# Patient Record
Sex: FEMALE | Race: WHITE | NOT HISPANIC OR LATINO | Employment: UNEMPLOYED | URBAN - METROPOLITAN AREA
[De-identification: names, ages, dates, MRNs, and addresses within clinical notes are randomized per-mention and may not be internally consistent; named-entity substitution may affect disease eponyms.]

---

## 2023-03-06 DIAGNOSIS — E78.5 HYPERLIPIDEMIA, UNSPECIFIED HYPERLIPIDEMIA TYPE: ICD-10-CM

## 2023-03-06 RX ORDER — ATORVASTATIN CALCIUM 10 MG/1
10 TABLET, FILM COATED ORAL DAILY
Qty: 30 TABLET | Refills: 5 | Status: SHIPPED | OUTPATIENT
Start: 2023-03-06 | End: 2023-06-22 | Stop reason: SDUPTHER

## 2023-03-07 DIAGNOSIS — F41.9 ANXIETY: ICD-10-CM

## 2023-03-07 DIAGNOSIS — I15.9 SECONDARY HYPERTENSION: ICD-10-CM

## 2023-03-07 RX ORDER — AMIODARONE HYDROCHLORIDE 200 MG/1
TABLET ORAL
Qty: 90 TABLET | Refills: 0 | Status: SHIPPED | OUTPATIENT
Start: 2023-03-07 | End: 2023-06-22 | Stop reason: SDUPTHER

## 2023-03-07 RX ORDER — PAROXETINE 10 MG/1
10 TABLET, FILM COATED ORAL EVERY MORNING
Qty: 90 TABLET | Refills: 0 | Status: SHIPPED | OUTPATIENT
Start: 2023-03-07 | End: 2023-06-22 | Stop reason: SDUPTHER

## 2023-03-08 RX ORDER — SPIRONOLACTONE 25 MG/1
25 TABLET ORAL DAILY
Qty: 90 TABLET | Refills: 0 | Status: SHIPPED | OUTPATIENT
Start: 2023-03-08 | End: 2023-06-22 | Stop reason: SDUPTHER

## 2023-03-08 RX ORDER — QUETIAPINE FUMARATE 25 MG/1
25 TABLET, FILM COATED ORAL 2 TIMES DAILY
Qty: 180 TABLET | Refills: 0 | Status: SHIPPED | OUTPATIENT
Start: 2023-03-08 | End: 2023-06-22 | Stop reason: SDUPTHER

## 2023-03-21 DIAGNOSIS — I10 HYPERTENSION, UNSPECIFIED TYPE: ICD-10-CM

## 2023-03-21 RX ORDER — METOPROLOL SUCCINATE 25 MG/1
1 TABLET, EXTENDED RELEASE ORAL DAILY
COMMUNITY
Start: 2021-11-03 | End: 2023-03-21 | Stop reason: SDUPTHER

## 2023-03-22 RX ORDER — METOPROLOL SUCCINATE 25 MG/1
25 TABLET, EXTENDED RELEASE ORAL DAILY
Qty: 90 TABLET | Refills: 0 | Status: SHIPPED | OUTPATIENT
Start: 2023-03-22 | End: 2023-06-22 | Stop reason: ALTCHOICE

## 2023-03-27 DIAGNOSIS — I48.91 ATRIAL FIBRILLATION, UNSPECIFIED TYPE (MULTI): ICD-10-CM

## 2023-03-27 RX ORDER — APIXABAN 2.5 MG/1
2.5 TABLET, FILM COATED ORAL 2 TIMES DAILY
COMMUNITY
End: 2023-03-27 | Stop reason: SDUPTHER

## 2023-03-28 RX ORDER — APIXABAN 2.5 MG/1
2.5 TABLET, FILM COATED ORAL 2 TIMES DAILY
Qty: 180 TABLET | Refills: 0 | Status: SHIPPED | OUTPATIENT
Start: 2023-03-28 | End: 2023-06-22 | Stop reason: SDUPTHER

## 2023-04-17 DIAGNOSIS — I10 HYPERTENSION, UNSPECIFIED TYPE: ICD-10-CM

## 2023-04-17 RX ORDER — AMLODIPINE BESYLATE 5 MG/1
5 TABLET ORAL DAILY
Qty: 90 TABLET | Refills: 0 | Status: SHIPPED | OUTPATIENT
Start: 2023-04-17 | End: 2023-06-22 | Stop reason: SDUPTHER

## 2023-04-17 RX ORDER — AMLODIPINE BESYLATE 5 MG/1
5 TABLET ORAL DAILY
COMMUNITY
End: 2023-04-17 | Stop reason: SDUPTHER

## 2023-05-25 ENCOUNTER — TELEPHONE (OUTPATIENT)
Dept: PRIMARY CARE | Facility: CLINIC | Age: 88
End: 2023-05-25
Payer: MEDICARE

## 2023-05-25 PROCEDURE — 99222 1ST HOSP IP/OBS MODERATE 55: CPT | Performed by: INTERNAL MEDICINE

## 2023-05-26 PROCEDURE — 99232 SBSQ HOSP IP/OBS MODERATE 35: CPT | Performed by: INTERNAL MEDICINE

## 2023-05-27 PROCEDURE — 99232 SBSQ HOSP IP/OBS MODERATE 35: CPT | Performed by: INTERNAL MEDICINE

## 2023-05-28 PROCEDURE — 99232 SBSQ HOSP IP/OBS MODERATE 35: CPT | Performed by: INTERNAL MEDICINE

## 2023-05-29 PROCEDURE — 99232 SBSQ HOSP IP/OBS MODERATE 35: CPT | Performed by: INTERNAL MEDICINE

## 2023-05-30 PROCEDURE — 99231 SBSQ HOSP IP/OBS SF/LOW 25: CPT | Performed by: INTERNAL MEDICINE

## 2023-05-31 PROCEDURE — 99231 SBSQ HOSP IP/OBS SF/LOW 25: CPT | Performed by: INTERNAL MEDICINE

## 2023-06-01 PROCEDURE — 99231 SBSQ HOSP IP/OBS SF/LOW 25: CPT | Performed by: INTERNAL MEDICINE

## 2023-06-02 PROCEDURE — 99238 HOSP IP/OBS DSCHRG MGMT 30/<: CPT | Performed by: INTERNAL MEDICINE

## 2023-06-03 PROCEDURE — 99306 1ST NF CARE HIGH MDM 50: CPT | Performed by: INTERNAL MEDICINE

## 2023-06-07 PROCEDURE — 99307 SBSQ NF CARE SF MDM 10: CPT | Performed by: INTERNAL MEDICINE

## 2023-06-14 PROCEDURE — 99307 SBSQ NF CARE SF MDM 10: CPT | Performed by: INTERNAL MEDICINE

## 2023-06-16 DIAGNOSIS — I10 HYPERTENSION, UNSPECIFIED TYPE: ICD-10-CM

## 2023-06-16 RX ORDER — METOPROLOL SUCCINATE 25 MG/1
25 TABLET, EXTENDED RELEASE ORAL DAILY
Qty: 30 TABLET | Refills: 0 | Status: CANCELLED | OUTPATIENT
Start: 2023-06-16 | End: 2023-07-16

## 2023-06-22 ENCOUNTER — OFFICE VISIT (OUTPATIENT)
Dept: PRIMARY CARE | Facility: CLINIC | Age: 88
End: 2023-06-22
Payer: MEDICARE

## 2023-06-22 VITALS
HEIGHT: 64 IN | BODY MASS INDEX: 19.97 KG/M2 | DIASTOLIC BLOOD PRESSURE: 56 MMHG | WEIGHT: 117 LBS | SYSTOLIC BLOOD PRESSURE: 102 MMHG

## 2023-06-22 DIAGNOSIS — I48.91 ATRIAL FIBRILLATION, UNSPECIFIED TYPE (MULTI): ICD-10-CM

## 2023-06-22 DIAGNOSIS — F41.9 ANXIETY: ICD-10-CM

## 2023-06-22 DIAGNOSIS — E78.5 HYPERLIPIDEMIA, UNSPECIFIED HYPERLIPIDEMIA TYPE: ICD-10-CM

## 2023-06-22 DIAGNOSIS — I15.9 SECONDARY HYPERTENSION: ICD-10-CM

## 2023-06-22 DIAGNOSIS — I10 HYPERTENSION, UNSPECIFIED TYPE: ICD-10-CM

## 2023-06-22 PROBLEM — F03.92 DEMENTIA WITH PSYCHOSIS (MULTI): Status: ACTIVE | Noted: 2023-06-22

## 2023-06-22 PROBLEM — R00.1 BRADYCARDIA: Status: ACTIVE | Noted: 2023-06-22

## 2023-06-22 PROBLEM — I63.9 CVA (CEREBRAL VASCULAR ACCIDENT) (MULTI): Status: ACTIVE | Noted: 2023-06-22

## 2023-06-22 PROBLEM — M54.50 LOW BACK PAIN: Status: ACTIVE | Noted: 2023-06-22

## 2023-06-22 PROBLEM — G47.00 INSOMNIA: Status: ACTIVE | Noted: 2023-06-22

## 2023-06-22 PROCEDURE — 99214 OFFICE O/P EST MOD 30 MIN: CPT | Performed by: INTERNAL MEDICINE

## 2023-06-22 PROCEDURE — 3078F DIAST BP <80 MM HG: CPT | Performed by: INTERNAL MEDICINE

## 2023-06-22 PROCEDURE — 1036F TOBACCO NON-USER: CPT | Performed by: INTERNAL MEDICINE

## 2023-06-22 PROCEDURE — 3074F SYST BP LT 130 MM HG: CPT | Performed by: INTERNAL MEDICINE

## 2023-06-22 PROCEDURE — 1159F MED LIST DOCD IN RCRD: CPT | Performed by: INTERNAL MEDICINE

## 2023-06-22 RX ORDER — SPIRONOLACTONE 25 MG/1
25 TABLET ORAL DAILY
Qty: 90 TABLET | Refills: 0 | Status: SHIPPED | OUTPATIENT
Start: 2023-06-22 | End: 2023-09-14 | Stop reason: SDUPTHER

## 2023-06-22 RX ORDER — TRAZODONE HYDROCHLORIDE 100 MG/1
100 TABLET ORAL NIGHTLY
COMMUNITY
End: 2023-08-29 | Stop reason: SDUPTHER

## 2023-06-22 RX ORDER — NAPROXEN SODIUM 220 MG/1
81 TABLET, FILM COATED ORAL DAILY
COMMUNITY
Start: 2021-11-06

## 2023-06-22 RX ORDER — PAROXETINE 10 MG/1
10 TABLET, FILM COATED ORAL EVERY MORNING
Qty: 90 TABLET | Refills: 0 | Status: SHIPPED | OUTPATIENT
Start: 2023-06-22 | End: 2023-09-14 | Stop reason: SDUPTHER

## 2023-06-22 RX ORDER — ATORVASTATIN CALCIUM 10 MG/1
10 TABLET, FILM COATED ORAL DAILY
Qty: 30 TABLET | Refills: 5 | Status: SHIPPED | OUTPATIENT
Start: 2023-06-22 | End: 2023-09-14 | Stop reason: SDUPTHER

## 2023-06-22 RX ORDER — ALBUTEROL SULFATE 90 UG/1
2 AEROSOL, METERED RESPIRATORY (INHALATION) EVERY 4 HOURS PRN
COMMUNITY

## 2023-06-22 RX ORDER — APIXABAN 2.5 MG/1
2.5 TABLET, FILM COATED ORAL 2 TIMES DAILY
Qty: 180 TABLET | Refills: 0 | Status: SHIPPED | OUTPATIENT
Start: 2023-06-22 | End: 2023-09-14 | Stop reason: SDUPTHER

## 2023-06-22 RX ORDER — AMIODARONE HYDROCHLORIDE 200 MG/1
TABLET ORAL
Qty: 90 TABLET | Refills: 0 | Status: SHIPPED | OUTPATIENT
Start: 2023-06-22 | End: 2023-09-14 | Stop reason: SDUPTHER

## 2023-06-22 RX ORDER — AMLODIPINE BESYLATE 5 MG/1
5 TABLET ORAL DAILY
Qty: 90 TABLET | Refills: 0 | Status: SHIPPED | OUTPATIENT
Start: 2023-06-22 | End: 2023-09-14 | Stop reason: SDUPTHER

## 2023-06-22 RX ORDER — QUETIAPINE FUMARATE 25 MG/1
25 TABLET, FILM COATED ORAL 2 TIMES DAILY
Qty: 180 TABLET | Refills: 0 | Status: SHIPPED | OUTPATIENT
Start: 2023-06-22 | End: 2023-09-14 | Stop reason: SDUPTHER

## 2023-06-22 NOTE — PROGRESS NOTES
Subjective   Patient ID: Heather Bowman is a 88 y.o. female who presents for Follow-up ([place]).    HPI   Patient is here for follow-up from hospital stay for bradycardia then she was at rehab and requiring oxygen but discharged home without oxygen need  Patient is much more alert since she is back home  Her dementia remains quite dense  She is eating little better  Overall doing well     Past recap  patient is here for follow-up  Needs refills on medication  did blood work   asking for samples of eliquis     She is staying with the   She is doing better than before  Family is keeping a close eye on her  She has quite advanced dementia  She remains unsteady on her feet had few falls without any injury  Needs flu shot  Psychosis is doing better      Patient is here from ER visit  Last week patient had a fall at home. She got up not using the walker turn around and fell. No syncopal episode. Her CAT scan and x-ray was negative  Patient remains very unsteady on her feet  Patient is having cough off and on chest feels congested  Daughter is wondering if patient can have something to help with her anxiety. Been the home care nurse comes patient gets extremely anxious and does not get comfortable with the bath  Caregiver is recommending patient take Ativan     Patient is monitoring blood pressure heart rate  Heart rate is staying above 45 mostly above 50  Patient is not dizzy  Patient just states none naps a lot  She does have problem with memory but no further deterioration  Blood pressure is okay     Patient was hospitalized for severe bradycardia and fatigue and CHF  Started on Lasix and her dose of metoprolol was reduced  Patient is still feeling tired she is here with the daughter she states eats and sleeps  Patient is very forgetful  Daughter wants to review all her medications     Patient is here for follow-up on hospital visit  Patient had multiple stroke in setting of new onset A. fib  She was started  "on Eliquis but patient is not taking Eliquis does not want to take it  She had some psychosis event which improved with Seroquel  Patient does have sundowning and dementia manifested more in the hospital  At home she is sleeping better on Seroquel and feeling better        past recap  To establish PCP  Moved from [place]  July 1 fell back in shower sprained right ankle  She is having a lot of pain since then  Also patient is having a lot of anxiety she has dementia and move has made her more anxious  We will be moving in the apartment soon  Daughter's main concern also remains insomnia  Patient is using Ambien apparently recently     Past medical history: Common bile duct stent for common bile duct stones s/p removal because of cholecystitis, A. fib 1 episode post procedure, hearing loss, dementia anxiety hypertension  Social history: Non-smoker nondrinker lives with    Review of Systems    Objective   /56   Ht 1.626 m (5' 4\")   Wt 53.1 kg (117 lb)   BMI 20.08 kg/m²     Physical Exam  Vitals reviewed.   Constitutional:       Appearance: Normal appearance.   HENT:      Head: Normocephalic and atraumatic.      Right Ear: Tympanic membrane, ear canal and external ear normal.      Left Ear: Tympanic membrane, ear canal and external ear normal.      Nose: Nose normal.      Mouth/Throat:      Pharynx: Oropharynx is clear.   Eyes:      Extraocular Movements: Extraocular movements intact.      Conjunctiva/sclera: Conjunctivae normal.      Pupils: Pupils are equal, round, and reactive to light.   Cardiovascular:      Rate and Rhythm: Normal rate and regular rhythm.      Pulses: Normal pulses.      Heart sounds: Normal heart sounds.   Pulmonary:      Effort: Pulmonary effort is normal.      Breath sounds: Normal breath sounds.   Abdominal:      General: Abdomen is flat. Bowel sounds are normal.      Palpations: Abdomen is soft.   Musculoskeletal:      Cervical back: Normal range of motion and neck supple. "   Skin:     General: Skin is warm and dry.   Neurological:      General: No focal deficit present.      Mental Status: She is alert.   Psychiatric:         Mood and Affect: Mood normal.         Assessment/Plan   Problem List Items Addressed This Visit          Circulatory    Atrial fibrillation (CMS/HCC)       Other    Anxiety     Other Visit Diagnoses       Hypertension, unspecified type        Hyperlipidemia, unspecified hyperlipidemia type        Secondary hypertension                 past recap  Patient is clinically stable  Remains forgetful  A. fib rate controlled  CHF compensated  Blood pressure stable  Medications refilled  Blood work reviewed  TSH suppressed probably related to amiodarone  We will repeat the blood test in 3 months        1/30/23  blood work reviewed  Thyroid is in normal range now  Recheck blood work in 6 months  Heart rate controlled  Blood pressure stable  Refill amiodarone  We will look for samples for Eliquis  She has wax impaction in the right ear  Advised to use mineral oil before next visit     6/22/2023  Patient's heart rate is better  Blood pressure is stable  She is more alert  Samples of Eliquis given per daughter's request  A-fib rate controlled  CHF compensated  Follow-up in 3 months

## 2023-08-29 DIAGNOSIS — G47.00 INSOMNIA, UNSPECIFIED TYPE: ICD-10-CM

## 2023-08-29 RX ORDER — TRAZODONE HYDROCHLORIDE 100 MG/1
100 TABLET ORAL NIGHTLY
Qty: 30 TABLET | Refills: 0 | Status: SHIPPED | OUTPATIENT
Start: 2023-08-29 | End: 2023-09-14 | Stop reason: SDUPTHER

## 2023-09-14 ENCOUNTER — OFFICE VISIT (OUTPATIENT)
Dept: PRIMARY CARE | Facility: CLINIC | Age: 88
End: 2023-09-14
Payer: MEDICARE

## 2023-09-14 VITALS
HEIGHT: 64 IN | DIASTOLIC BLOOD PRESSURE: 54 MMHG | WEIGHT: 107 LBS | BODY MASS INDEX: 18.27 KG/M2 | SYSTOLIC BLOOD PRESSURE: 106 MMHG

## 2023-09-14 DIAGNOSIS — G47.00 INSOMNIA, UNSPECIFIED TYPE: ICD-10-CM

## 2023-09-14 DIAGNOSIS — I15.9 SECONDARY HYPERTENSION: ICD-10-CM

## 2023-09-14 DIAGNOSIS — I10 HYPERTENSION, UNSPECIFIED TYPE: ICD-10-CM

## 2023-09-14 DIAGNOSIS — E78.5 HYPERLIPIDEMIA, UNSPECIFIED HYPERLIPIDEMIA TYPE: ICD-10-CM

## 2023-09-14 DIAGNOSIS — Z23 ENCOUNTER FOR IMMUNIZATION: ICD-10-CM

## 2023-09-14 DIAGNOSIS — F41.9 ANXIETY: ICD-10-CM

## 2023-09-14 DIAGNOSIS — I48.91 ATRIAL FIBRILLATION, UNSPECIFIED TYPE (MULTI): ICD-10-CM

## 2023-09-14 PROCEDURE — 3078F DIAST BP <80 MM HG: CPT | Performed by: INTERNAL MEDICINE

## 2023-09-14 PROCEDURE — 1159F MED LIST DOCD IN RCRD: CPT | Performed by: INTERNAL MEDICINE

## 2023-09-14 PROCEDURE — 3074F SYST BP LT 130 MM HG: CPT | Performed by: INTERNAL MEDICINE

## 2023-09-14 PROCEDURE — 99214 OFFICE O/P EST MOD 30 MIN: CPT | Performed by: INTERNAL MEDICINE

## 2023-09-14 PROCEDURE — 90662 IIV NO PRSV INCREASED AG IM: CPT | Performed by: INTERNAL MEDICINE

## 2023-09-14 PROCEDURE — G0008 ADMIN INFLUENZA VIRUS VAC: HCPCS | Performed by: INTERNAL MEDICINE

## 2023-09-14 PROCEDURE — 1036F TOBACCO NON-USER: CPT | Performed by: INTERNAL MEDICINE

## 2023-09-14 RX ORDER — AMLODIPINE BESYLATE 5 MG/1
5 TABLET ORAL DAILY
Qty: 90 TABLET | Refills: 1 | Status: SHIPPED | OUTPATIENT
Start: 2023-09-14

## 2023-09-14 RX ORDER — QUETIAPINE FUMARATE 25 MG/1
25 TABLET, FILM COATED ORAL 2 TIMES DAILY
Qty: 180 TABLET | Refills: 0 | Status: SHIPPED | OUTPATIENT
Start: 2023-09-14 | End: 2023-12-13

## 2023-09-14 RX ORDER — TRAZODONE HYDROCHLORIDE 100 MG/1
100 TABLET ORAL NIGHTLY
Qty: 90 TABLET | Refills: 1 | Status: SHIPPED | OUTPATIENT
Start: 2023-09-14 | End: 2023-12-13

## 2023-09-14 RX ORDER — APIXABAN 2.5 MG/1
2.5 TABLET, FILM COATED ORAL 2 TIMES DAILY
Qty: 180 TABLET | Refills: 1 | Status: SHIPPED | OUTPATIENT
Start: 2023-09-14 | End: 2023-12-13

## 2023-09-14 RX ORDER — SPIRONOLACTONE 25 MG/1
25 TABLET ORAL DAILY
Qty: 90 TABLET | Refills: 1 | Status: SHIPPED | OUTPATIENT
Start: 2023-09-14 | End: 2024-05-06 | Stop reason: SDUPTHER

## 2023-09-14 RX ORDER — QUETIAPINE FUMARATE 25 MG/1
25 TABLET, FILM COATED ORAL 2 TIMES DAILY
Qty: 180 TABLET | Refills: 0 | Status: CANCELLED | OUTPATIENT
Start: 2023-09-14 | End: 2023-12-13

## 2023-09-14 RX ORDER — PAROXETINE 10 MG/1
10 TABLET, FILM COATED ORAL EVERY MORNING
Qty: 90 TABLET | Refills: 1 | Status: SHIPPED | OUTPATIENT
Start: 2023-09-14 | End: 2024-04-23 | Stop reason: SDUPTHER

## 2023-09-14 RX ORDER — ATORVASTATIN CALCIUM 10 MG/1
10 TABLET, FILM COATED ORAL DAILY
Qty: 90 TABLET | Refills: 1 | Status: SHIPPED | OUTPATIENT
Start: 2023-09-14 | End: 2023-12-13

## 2023-09-14 RX ORDER — AMIODARONE HYDROCHLORIDE 200 MG/1
TABLET ORAL
Qty: 90 TABLET | Refills: 1 | Status: SHIPPED | OUTPATIENT
Start: 2023-09-14

## 2023-09-14 ASSESSMENT — ENCOUNTER SYMPTOMS
DEPRESSION: 0
LOSS OF SENSATION IN FEET: 0
OCCASIONAL FEELINGS OF UNSTEADINESS: 0

## 2023-09-14 NOTE — PROGRESS NOTES
Subjective   Patient ID: Heather Bowman is a 89 y.o. female who presents for Follow-up and Med Refill.    Med Refill       Almost nonverbal.  She lost her  recently.  Daughter is coming and staying with her   .  Daughter is asking for samples for Eliquis   Follow-up on hypertension high cholesterol A-fib.  Needs refills on medication    A-fib patient is here for follow-up from hospital stay for bradycardia then she was at rehab and requiring oxygen but discharged home without oxygen need  Patient is much more alert since she is back home  Her dementia remains quite dense  She is eating little better  Overall doing well      Past recap  patient is here for follow-up  Needs refills on medication  did blood work   asking for samples of eliquis     She is staying with the   She is doing better than before  Family is keeping a close eye on her  She has quite advanced dementia  She remains unsteady on her feet had few falls without any injury  Needs flu shot  Psychosis is doing better      Patient is here from ER visit  Last week patient had a fall at home. She got up not using the walker turn around and fell. No syncopal episode. Her CAT scan and x-ray was negative  Patient remains very unsteady on her feet  Patient is having cough off and on chest feels congested  Daughter is wondering if patient can have something to help with her anxiety. Been the home care nurse comes patient gets extremely anxious and does not get comfortable with the bath  Caregiver is recommending patient take Ativan     Patient is monitoring blood pressure heart rate  Heart rate is staying above 45 mostly above 50  Patient is not dizzy  Patient just states none naps a lot  She does have problem with memory but no further deterioration  Blood pressure is okay     Patient was hospitalized for severe bradycardia and fatigue and CHF  Started on Lasix and her dose of metoprolol was reduced  Patient is still feeling tired she is here  "with the daughter she states eats and sleeps  Patient is very forgetful  Daughter wants to review all her medications     Patient is here for follow-up on hospital visit  Patient had multiple stroke in setting of new onset A. fib  She was started on Eliquis but patient is not taking Eliquis does not want to take it  She had some psychosis event which improved with Seroquel  Patient does have sundowning and dementia manifested more in the hospital  At home she is sleeping better on Seroquel and feeling better        past recap  To establish PCP  Moved from [place]  July 1 fell back in shower sprained right ankle  She is having a lot of pain since then  Also patient is having a lot of anxiety she has dementia and move has made her more anxious  We will be moving in the apartment soon  Daughter's main concern also remains insomnia  Patient is using Ambien apparently recently     Past medical history: Common bile duct stent for common bile duct stones s/p removal because of cholecystitis, A. fib 1 episode post procedure, hearing loss, dementia anxiety hypertension  Social history: Non-smoker nondrinker lives with  patient is here for routine follow-up.  She is  Review of Systems    Objective   /54   Ht 1.626 m (5' 4\")   Wt 48.5 kg (107 lb)   BMI 18.37 kg/m²     Physical Exam  Vitals reviewed.   Constitutional:       Appearance: Normal appearance.   HENT:      Head: Normocephalic and atraumatic.      Right Ear: Tympanic membrane, ear canal and external ear normal.      Left Ear: Tympanic membrane, ear canal and external ear normal.      Nose: Nose normal.      Mouth/Throat:      Pharynx: Oropharynx is clear.   Eyes:      Extraocular Movements: Extraocular movements intact.      Conjunctiva/sclera: Conjunctivae normal.      Pupils: Pupils are equal, round, and reactive to light.   Cardiovascular:      Rate and Rhythm: Normal rate and regular rhythm.      Pulses: Normal pulses.      Heart sounds: Normal " heart sounds.   Pulmonary:      Effort: Pulmonary effort is normal.      Breath sounds: Normal breath sounds.   Abdominal:      General: Abdomen is flat. Bowel sounds are normal.      Palpations: Abdomen is soft.   Musculoskeletal:      Cervical back: Normal range of motion and neck supple.   Skin:     General: Skin is warm and dry.   Neurological:      General: No focal deficit present.      Mental Status: She is alert.   Psychiatric:         Mood and Affect: Mood normal.         Assessment/Plan   Problem List Items Addressed This Visit          Cardiac and Vasculature    Atrial fibrillation (CMS/HCC)    Relevant Medications    Eliquis 2.5 mg tablet    amLODIPine (Norvasc) 5 mg tablet    Other Relevant Orders    CBC    Comprehensive Metabolic Panel    Lipid Panel       Mental Health    Anxiety    Relevant Medications    PARoxetine (Paxil) 10 mg tablet    QUEtiapine (SEROquel) 25 mg tablet    Other Relevant Orders    CBC    Comprehensive Metabolic Panel    Lipid Panel       Sleep    Insomnia    Relevant Medications    traZODone (Desyrel) 100 mg tablet    Other Relevant Orders    CBC    Comprehensive Metabolic Panel    Lipid Panel     Other Visit Diagnoses       Secondary hypertension        Relevant Medications    amiodarone (Pacerone) 200 mg tablet    spironolactone (Aldactone) 25 mg tablet    Other Relevant Orders    CBC    Comprehensive Metabolic Panel    Lipid Panel    Hypertension, unspecified type        Relevant Medications    amLODIPine (Norvasc) 5 mg tablet    Other Relevant Orders    CBC    Comprehensive Metabolic Panel    Lipid Panel    Hyperlipidemia, unspecified hyperlipidemia type        Relevant Medications    atorvastatin (Lipitor) 10 mg tablet    Other Relevant Orders    CBC    Comprehensive Metabolic Panel    Lipid Panel    Encounter for immunization        Relevant Medications    respiratory syncytial virus, rsv, with adjuvant suspension (Arexvy) 120 mcg/0.5 mL suspension for reconstitution     Other Relevant Orders    Flu vaccine, quadrivalent, high-dose, preservative free, age 65y+ (FLUZONE) (Completed)         past recap  Patient is clinically stable  Remains forgetful  A. fib rate controlled  CHF compensated  Blood pressure stable  Medications refilled  Blood work reviewed  TSH suppressed probably related to amiodarone  We will repeat the blood test in 3 months        1/30/23  blood work reviewed  Thyroid is in normal range now  Recheck blood work in 6 months  Heart rate controlled  Blood pressure stable  Refill amiodarone  We will look for samples for Eliquis  She has wax impaction in the right ear  Advised to use mineral oil before next visit      6/22/2023  Patient's heart rate is better  Blood pressure is stable  She is more alert  Samples of Eliquis given per daughter's request  A-fib rate controlled  CHF compensated  Follow-up in 3 months    9/14/2023  Clinically patient is stable and in fact doing better she is more alert  A-fib is controlled  CHF compensated  Blood pressure is stable  Blood work reviewed all in normal range  Samples of Eliquis given  Follow-up in 3 months

## 2024-03-13 ENCOUNTER — APPOINTMENT (OUTPATIENT)
Dept: PRIMARY CARE | Facility: CLINIC | Age: 89
End: 2024-03-13
Payer: MEDICARE

## 2024-03-13 ENCOUNTER — OFFICE VISIT (OUTPATIENT)
Dept: PRIMARY CARE | Facility: CLINIC | Age: 89
End: 2024-03-13
Payer: MEDICARE

## 2024-03-13 VITALS
BODY MASS INDEX: 19.53 KG/M2 | WEIGHT: 114.4 LBS | DIASTOLIC BLOOD PRESSURE: 64 MMHG | HEIGHT: 64 IN | SYSTOLIC BLOOD PRESSURE: 110 MMHG

## 2024-03-13 DIAGNOSIS — I10 BENIGN ESSENTIAL HYPERTENSION: ICD-10-CM

## 2024-03-13 DIAGNOSIS — I48.11 LONGSTANDING PERSISTENT ATRIAL FIBRILLATION (MULTI): Primary | ICD-10-CM

## 2024-03-13 DIAGNOSIS — F03.92 DEMENTIA WITH PSYCHOSIS (MULTI): ICD-10-CM

## 2024-03-13 DIAGNOSIS — F41.9 ANXIETY: ICD-10-CM

## 2024-03-13 PROCEDURE — 1159F MED LIST DOCD IN RCRD: CPT | Performed by: INTERNAL MEDICINE

## 2024-03-13 PROCEDURE — 3078F DIAST BP <80 MM HG: CPT | Performed by: INTERNAL MEDICINE

## 2024-03-13 PROCEDURE — 1157F ADVNC CARE PLAN IN RCRD: CPT | Performed by: INTERNAL MEDICINE

## 2024-03-13 PROCEDURE — 99213 OFFICE O/P EST LOW 20 MIN: CPT | Performed by: INTERNAL MEDICINE

## 2024-03-13 PROCEDURE — 1036F TOBACCO NON-USER: CPT | Performed by: INTERNAL MEDICINE

## 2024-03-13 PROCEDURE — 3074F SYST BP LT 130 MM HG: CPT | Performed by: INTERNAL MEDICINE

## 2024-03-13 ASSESSMENT — ENCOUNTER SYMPTOMS
OCCASIONAL FEELINGS OF UNSTEADINESS: 0
LOSS OF SENSATION IN FEET: 0
DEPRESSION: 0

## 2024-03-13 NOTE — PROGRESS NOTES
Subjective   Patient ID: Heather Bowman is a 89 y.o. female who presents for No chief complaint on file..    Med Refill       Almost nonverbal.  Since she lost her  and daughter is staying with her and taking care of her it is getting hard on her  Did not do blood work  Follow-up on hypertension high cholesterol A-fib.  Needs refills on medication    A-fib patient is here for follow-up from hospital stay for bradycardia then she was at rehab and requiring oxygen but discharged home without oxygen need  Patient is much more alert since she is back home  Her dementia remains quite dense  She is eating little better  Overall doing well      Past recap  patient is here for follow-up  Needs refills on medication  did blood work   asking for samples of eliquis     She is staying with the   She is doing better than before  Family is keeping a close eye on her  She has quite advanced dementia  She remains unsteady on her feet had few falls without any injury  Needs flu shot  Psychosis is doing better      Patient is here from ER visit  Last week patient had a fall at home. She got up not using the walker turn around and fell. No syncopal episode. Her CAT scan and x-ray was negative  Patient remains very unsteady on her feet  Patient is having cough off and on chest feels congested  Daughter is wondering if patient can have something to help with her anxiety. Been the home care nurse comes patient gets extremely anxious and does not get comfortable with the bath  Caregiver is recommending patient take Ativan     Patient is monitoring blood pressure heart rate  Heart rate is staying above 45 mostly above 50  Patient is not dizzy  Patient just states none naps a lot  She does have problem with memory but no further deterioration  Blood pressure is okay     Patient was hospitalized for severe bradycardia and fatigue and CHF  Started on Lasix and her dose of metoprolol was reduced  Patient is still feeling tired  "she is here with the daughter she states eats and sleeps  Patient is very forgetful  Daughter wants to review all her medications     Patient is here for follow-up on hospital visit  Patient had multiple stroke in setting of new onset A. fib  She was started on Eliquis but patient is not taking Eliquis does not want to take it  She had some psychosis event which improved with Seroquel  Patient does have sundowning and dementia manifested more in the hospital  At home she is sleeping better on Seroquel and feeling better        past recap  To establish PCP  Moved from [place]  July 1 fell back in shower sprained right ankle  She is having a lot of pain since then  Also patient is having a lot of anxiety she has dementia and move has made her more anxious  We will be moving in the apartment soon  Daughter's main concern also remains insomnia  Patient is using Ambien apparently recently     Past medical history: Common bile duct stent for common bile duct stones s/p removal because of cholecystitis, A. fib 1 episode post procedure, hearing loss, dementia anxiety hypertension  Social history: Non-smoker nondrinker lives with  patient is here for routine follow-up.  She is  Review of Systems    Objective   /64   Ht 1.626 m (5' 4\")   Wt 51.9 kg (114 lb 6.4 oz)   BMI 19.64 kg/m²     Physical Exam  Vitals reviewed.   Constitutional:       Appearance: Normal appearance.   HENT:      Head: Normocephalic and atraumatic.      Right Ear: Tympanic membrane, ear canal and external ear normal.      Left Ear: Tympanic membrane, ear canal and external ear normal.      Nose: Nose normal.      Mouth/Throat:      Pharynx: Oropharynx is clear.   Eyes:      Extraocular Movements: Extraocular movements intact.      Conjunctiva/sclera: Conjunctivae normal.      Pupils: Pupils are equal, round, and reactive to light.   Cardiovascular:      Rate and Rhythm: Normal rate and regular rhythm.      Pulses: Normal pulses.      Heart " sounds: Normal heart sounds.   Pulmonary:      Effort: Pulmonary effort is normal.      Breath sounds: Normal breath sounds.   Abdominal:      General: Abdomen is flat. Bowel sounds are normal.      Palpations: Abdomen is soft.   Musculoskeletal:      Cervical back: Normal range of motion and neck supple.   Skin:     General: Skin is warm and dry.   Neurological:      General: No focal deficit present.      Mental Status: She is alert.   Psychiatric:         Mood and Affect: Mood normal.         Assessment/Plan   Problem List Items Addressed This Visit          Cardiac and Vasculature    Atrial fibrillation (CMS/HCC) - Primary    Benign essential hypertension       Mental Health    Anxiety    Dementia with psychosis (CMS/HCC)    past recap  Patient is clinically stable  Remains forgetful  A. fib rate controlled  CHF compensated  Blood pressure stable  Medications refilled  Blood work reviewed  TSH suppressed probably related to amiodarone  We will repeat the blood test in 3 months        1/30/23  blood work reviewed  Thyroid is in normal range now  Recheck blood work in 6 months  Heart rate controlled  Blood pressure stable  Refill amiodarone  We will look for samples for Eliquis  She has wax impaction in the right ear  Advised to use mineral oil before next visit      6/22/2023  Patient's heart rate is better  Blood pressure is stable  She is more alert  Samples of Eliquis given per daughter's request  A-fib rate controlled  CHF compensated  Follow-up in 3 months    9/14/2023  Clinically patient is stable and in fact doing better she is more alert  A-fib is controlled  CHF compensated  Blood pressure is stable  Blood work reviewed all in normal range  Samples of Eliquis given  Follow-up in 3 months    3/13/2024  Patient clinically looks better she is little bit verbal more  A-fib controlled  Blood pressure controlled  Blood work ordered for today again as patient has not done it  Medications refilled  Follow-up in  6 months

## 2024-03-19 ENCOUNTER — LAB (OUTPATIENT)
Dept: LAB | Facility: LAB | Age: 89
End: 2024-03-19
Payer: MEDICARE

## 2024-03-19 DIAGNOSIS — I15.9 SECONDARY HYPERTENSION: ICD-10-CM

## 2024-03-19 DIAGNOSIS — F41.9 ANXIETY: ICD-10-CM

## 2024-03-19 DIAGNOSIS — I48.91 ATRIAL FIBRILLATION, UNSPECIFIED TYPE (MULTI): ICD-10-CM

## 2024-03-19 DIAGNOSIS — G47.00 INSOMNIA, UNSPECIFIED TYPE: ICD-10-CM

## 2024-03-19 DIAGNOSIS — E78.5 HYPERLIPIDEMIA, UNSPECIFIED HYPERLIPIDEMIA TYPE: ICD-10-CM

## 2024-03-19 DIAGNOSIS — I10 HYPERTENSION, UNSPECIFIED TYPE: ICD-10-CM

## 2024-03-19 LAB
ALBUMIN SERPL BCP-MCNC: 4.3 G/DL (ref 3.4–5)
ALP SERPL-CCNC: 113 U/L (ref 33–136)
ALT SERPL W P-5'-P-CCNC: 21 U/L (ref 7–45)
ANION GAP SERPL CALC-SCNC: 10 MMOL/L (ref 10–20)
AST SERPL W P-5'-P-CCNC: 22 U/L (ref 9–39)
BILIRUB SERPL-MCNC: 0.8 MG/DL (ref 0–1.2)
BUN SERPL-MCNC: 30 MG/DL (ref 6–23)
CALCIUM SERPL-MCNC: 9.9 MG/DL (ref 8.6–10.6)
CHLORIDE SERPL-SCNC: 101 MMOL/L (ref 98–107)
CHOLEST SERPL-MCNC: 157 MG/DL (ref 0–199)
CHOLESTEROL/HDL RATIO: 3.7
CO2 SERPL-SCNC: 35 MMOL/L (ref 21–32)
CREAT SERPL-MCNC: 1.1 MG/DL (ref 0.5–1.05)
EGFRCR SERPLBLD CKD-EPI 2021: 48 ML/MIN/1.73M*2
ERYTHROCYTE [DISTWIDTH] IN BLOOD BY AUTOMATED COUNT: 13.2 % (ref 11.5–14.5)
GLUCOSE SERPL-MCNC: 137 MG/DL (ref 74–99)
HCT VFR BLD AUTO: 46.4 % (ref 36–46)
HDLC SERPL-MCNC: 42.8 MG/DL
HGB BLD-MCNC: 15.1 G/DL (ref 12–16)
LDLC SERPL CALC-MCNC: 89 MG/DL
MCH RBC QN AUTO: 31 PG (ref 26–34)
MCHC RBC AUTO-ENTMCNC: 32.5 G/DL (ref 32–36)
MCV RBC AUTO: 95 FL (ref 80–100)
NON HDL CHOLESTEROL: 114 MG/DL (ref 0–149)
NRBC BLD-RTO: 0 /100 WBCS (ref 0–0)
PLATELET # BLD AUTO: 185 X10*3/UL (ref 150–450)
POTASSIUM SERPL-SCNC: 4.4 MMOL/L (ref 3.5–5.3)
PROT SERPL-MCNC: 7.6 G/DL (ref 6.4–8.2)
RBC # BLD AUTO: 4.87 X10*6/UL (ref 4–5.2)
SODIUM SERPL-SCNC: 142 MMOL/L (ref 136–145)
TRIGL SERPL-MCNC: 126 MG/DL (ref 0–149)
VLDL: 25 MG/DL (ref 0–40)
WBC # BLD AUTO: 7.6 X10*3/UL (ref 4.4–11.3)

## 2024-03-19 PROCEDURE — 80053 COMPREHEN METABOLIC PANEL: CPT

## 2024-03-19 PROCEDURE — 80061 LIPID PANEL: CPT

## 2024-03-19 PROCEDURE — 85027 COMPLETE CBC AUTOMATED: CPT

## 2024-03-19 PROCEDURE — 36415 COLL VENOUS BLD VENIPUNCTURE: CPT

## 2024-03-26 ENCOUNTER — APPOINTMENT (OUTPATIENT)
Dept: RADIOLOGY | Facility: HOSPITAL | Age: 89
End: 2024-03-26
Payer: MEDICARE

## 2024-03-26 ENCOUNTER — HOSPITAL ENCOUNTER (EMERGENCY)
Facility: HOSPITAL | Age: 89
Discharge: HOME | End: 2024-03-26
Attending: STUDENT IN AN ORGANIZED HEALTH CARE EDUCATION/TRAINING PROGRAM
Payer: MEDICARE

## 2024-03-26 VITALS
SYSTOLIC BLOOD PRESSURE: 101 MMHG | HEART RATE: 60 BPM | OXYGEN SATURATION: 96 % | HEIGHT: 64 IN | DIASTOLIC BLOOD PRESSURE: 43 MMHG | WEIGHT: 123.9 LBS | RESPIRATION RATE: 18 BRPM | BODY MASS INDEX: 21.15 KG/M2 | TEMPERATURE: 98.1 F

## 2024-03-26 DIAGNOSIS — S09.90XA CLOSED HEAD INJURY, INITIAL ENCOUNTER: Primary | ICD-10-CM
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
## 2024-03-26 LAB
ABO GROUP (TYPE) IN BLOOD: NORMAL
ABO GROUP (TYPE) IN BLOOD: NORMAL
ALBUMIN SERPL-MCNC: 3.6 G/DL (ref 3.5–5)
ALP BLD-CCNC: 102 U/L (ref 35–125)
ALT SERPL-CCNC: 20 U/L (ref 5–40)
ANION GAP SERPL CALC-SCNC: 7 MMOL/L
ANTIBODY SCREEN: NORMAL
APPEARANCE UR: CLEAR
AST SERPL-CCNC: 23 U/L (ref 5–40)
BASOPHILS # BLD AUTO: 0.03 X10*3/UL (ref 0–0.1)
BASOPHILS NFR BLD AUTO: 0.6 %
BILIRUB SERPL-MCNC: 0.4 MG/DL (ref 0.1–1.2)
BILIRUB UR STRIP.AUTO-MCNC: NEGATIVE MG/DL
BUN SERPL-MCNC: 24 MG/DL (ref 8–25)
CALCIUM SERPL-MCNC: 9.1 MG/DL (ref 8.5–10.4)
CHLORIDE SERPL-SCNC: 103 MMOL/L (ref 97–107)
CO2 SERPL-SCNC: 29 MMOL/L (ref 24–31)
COLOR UR: ABNORMAL
CREAT SERPL-MCNC: 1.1 MG/DL (ref 0.4–1.6)
EGFRCR SERPLBLD CKD-EPI 2021: 48 ML/MIN/1.73M*2
EOSINOPHIL # BLD AUTO: 0.18 X10*3/UL (ref 0–0.4)
EOSINOPHIL NFR BLD AUTO: 3.4 %
ERYTHROCYTE [DISTWIDTH] IN BLOOD BY AUTOMATED COUNT: 13.2 % (ref 11.5–14.5)
ETHANOL SERPL-MCNC: <0.01 G/DL
GLUCOSE SERPL-MCNC: 100 MG/DL (ref 65–99)
GLUCOSE UR STRIP.AUTO-MCNC: NORMAL MG/DL
HCT VFR BLD AUTO: 39.1 % (ref 36–46)
HGB BLD-MCNC: 12.5 G/DL (ref 12–16)
HOLD SPECIMEN: NORMAL
IMM GRANULOCYTES # BLD AUTO: 0.06 X10*3/UL (ref 0–0.5)
IMM GRANULOCYTES NFR BLD AUTO: 1.1 % (ref 0–0.9)
INR PPP: 1.1 (ref 0.9–1.2)
KETONES UR STRIP.AUTO-MCNC: NEGATIVE MG/DL
LACTATE BLDV-SCNC: 1.1 MMOL/L (ref 0.4–2)
LEUKOCYTE ESTERASE UR QL STRIP.AUTO: NEGATIVE
LYMPHOCYTES # BLD AUTO: 1.4 X10*3/UL (ref 0.8–3)
LYMPHOCYTES NFR BLD AUTO: 26.6 %
MCH RBC QN AUTO: 30.3 PG (ref 26–34)
MCHC RBC AUTO-ENTMCNC: 32 G/DL (ref 32–36)
MCV RBC AUTO: 95 FL (ref 80–100)
MONOCYTES # BLD AUTO: 0.3 X10*3/UL (ref 0.05–0.8)
MONOCYTES NFR BLD AUTO: 5.7 %
MUCOUS THREADS #/AREA URNS AUTO: ABNORMAL /LPF
NEUTROPHILS # BLD AUTO: 3.29 X10*3/UL (ref 1.6–5.5)
NEUTROPHILS NFR BLD AUTO: 62.6 %
NITRITE UR QL STRIP.AUTO: NEGATIVE
NRBC BLD-RTO: 0 /100 WBCS (ref 0–0)
NT-PROBNP SERPL-MCNC: 387 PG/ML (ref 0–624)
PH UR STRIP.AUTO: 7 [PH]
PLATELET # BLD AUTO: 149 X10*3/UL (ref 150–450)
POTASSIUM SERPL-SCNC: 4.8 MMOL/L (ref 3.4–5.1)
PROT SERPL-MCNC: 6.6 G/DL (ref 5.9–7.9)
PROT UR STRIP.AUTO-MCNC: NEGATIVE MG/DL
PROTHROMBIN TIME: 11.6 SECONDS (ref 9.3–12.7)
RBC # BLD AUTO: 4.13 X10*6/UL (ref 4–5.2)
RBC # UR STRIP.AUTO: ABNORMAL /UL
RBC #/AREA URNS AUTO: ABNORMAL /HPF
RH FACTOR (ANTIGEN D): NORMAL
RH FACTOR (ANTIGEN D): NORMAL
SODIUM SERPL-SCNC: 139 MMOL/L (ref 133–145)
SP GR UR STRIP.AUTO: 1.02
SQUAMOUS #/AREA URNS AUTO: ABNORMAL /HPF
TROPONIN T SERPL-MCNC: 18 NG/L
TROPONIN T SERPL-MCNC: 19 NG/L
UROBILINOGEN UR STRIP.AUTO-MCNC: ABNORMAL MG/DL
WBC # BLD AUTO: 5.3 X10*3/UL (ref 4.4–11.3)
WBC #/AREA URNS AUTO: ABNORMAL /HPF

## 2024-03-26 PROCEDURE — 36415 COLL VENOUS BLD VENIPUNCTURE: CPT | Performed by: STUDENT IN AN ORGANIZED HEALTH CARE EDUCATION/TRAINING PROGRAM

## 2024-03-26 PROCEDURE — 86901 BLOOD TYPING SEROLOGIC RH(D): CPT | Performed by: STUDENT IN AN ORGANIZED HEALTH CARE EDUCATION/TRAINING PROGRAM

## 2024-03-26 PROCEDURE — 85025 COMPLETE CBC W/AUTO DIFF WBC: CPT | Performed by: STUDENT IN AN ORGANIZED HEALTH CARE EDUCATION/TRAINING PROGRAM

## 2024-03-26 PROCEDURE — 85610 PROTHROMBIN TIME: CPT | Performed by: STUDENT IN AN ORGANIZED HEALTH CARE EDUCATION/TRAINING PROGRAM

## 2024-03-26 PROCEDURE — 72125 CT NECK SPINE W/O DYE: CPT | Performed by: RADIOLOGY

## 2024-03-26 PROCEDURE — 2500000004 HC RX 250 GENERAL PHARMACY W/ HCPCS (ALT 636 FOR OP/ED): Performed by: STUDENT IN AN ORGANIZED HEALTH CARE EDUCATION/TRAINING PROGRAM

## 2024-03-26 PROCEDURE — 81001 URINALYSIS AUTO W/SCOPE: CPT | Performed by: STUDENT IN AN ORGANIZED HEALTH CARE EDUCATION/TRAINING PROGRAM

## 2024-03-26 PROCEDURE — 70450 CT HEAD/BRAIN W/O DYE: CPT

## 2024-03-26 PROCEDURE — 90471 IMMUNIZATION ADMIN: CPT | Performed by: STUDENT IN AN ORGANIZED HEALTH CARE EDUCATION/TRAINING PROGRAM

## 2024-03-26 PROCEDURE — 84484 ASSAY OF TROPONIN QUANT: CPT | Performed by: STUDENT IN AN ORGANIZED HEALTH CARE EDUCATION/TRAINING PROGRAM

## 2024-03-26 PROCEDURE — G0390 TRAUMA RESPONS W/HOSP CRITI: HCPCS

## 2024-03-26 PROCEDURE — 80053 COMPREHEN METABOLIC PANEL: CPT | Performed by: STUDENT IN AN ORGANIZED HEALTH CARE EDUCATION/TRAINING PROGRAM

## 2024-03-26 PROCEDURE — 71045 X-RAY EXAM CHEST 1 VIEW: CPT | Performed by: RADIOLOGY

## 2024-03-26 PROCEDURE — 90715 TDAP VACCINE 7 YRS/> IM: CPT | Performed by: STUDENT IN AN ORGANIZED HEALTH CARE EDUCATION/TRAINING PROGRAM

## 2024-03-26 PROCEDURE — 72125 CT NECK SPINE W/O DYE: CPT

## 2024-03-26 PROCEDURE — 99285 EMERGENCY DEPT VISIT HI MDM: CPT | Mod: 25

## 2024-03-26 PROCEDURE — 71045 X-RAY EXAM CHEST 1 VIEW: CPT

## 2024-03-26 PROCEDURE — 72170 X-RAY EXAM OF PELVIS: CPT

## 2024-03-26 PROCEDURE — 82077 ASSAY SPEC XCP UR&BREATH IA: CPT | Performed by: STUDENT IN AN ORGANIZED HEALTH CARE EDUCATION/TRAINING PROGRAM

## 2024-03-26 PROCEDURE — 83605 ASSAY OF LACTIC ACID: CPT | Performed by: STUDENT IN AN ORGANIZED HEALTH CARE EDUCATION/TRAINING PROGRAM

## 2024-03-26 PROCEDURE — 83880 ASSAY OF NATRIURETIC PEPTIDE: CPT | Performed by: STUDENT IN AN ORGANIZED HEALTH CARE EDUCATION/TRAINING PROGRAM

## 2024-03-26 PROCEDURE — 70450 CT HEAD/BRAIN W/O DYE: CPT | Performed by: RADIOLOGY

## 2024-03-26 PROCEDURE — 72170 X-RAY EXAM OF PELVIS: CPT | Performed by: RADIOLOGY

## 2024-03-26 RX ADMIN — TETANUS TOXOID, REDUCED DIPHTHERIA TOXOID AND ACELLULAR PERTUSSIS VACCINE, ADSORBED 0.5 ML: 5; 2.5; 8; 8; 2.5 SUSPENSION INTRAMUSCULAR at 11:49

## 2024-03-26 ASSESSMENT — PAIN - FUNCTIONAL ASSESSMENT: PAIN_FUNCTIONAL_ASSESSMENT: 0-10

## 2024-03-26 ASSESSMENT — COLUMBIA-SUICIDE SEVERITY RATING SCALE - C-SSRS
1. IN THE PAST MONTH, HAVE YOU WISHED YOU WERE DEAD OR WISHED YOU COULD GO TO SLEEP AND NOT WAKE UP?: NO
6. HAVE YOU EVER DONE ANYTHING, STARTED TO DO ANYTHING, OR PREPARED TO DO ANYTHING TO END YOUR LIFE?: NO
2. HAVE YOU ACTUALLY HAD ANY THOUGHTS OF KILLING YOURSELF?: NO

## 2024-03-26 ASSESSMENT — PAIN SCALES - GENERAL: PAINLEVEL_OUTOF10: 0 - NO PAIN

## 2024-03-26 NOTE — ED TRIAGE NOTES
Unwitnessed fall at home. Daughter heard her fall, found her in the bathroom on floor. Pt is on eliquis. Daughter states she has early dementia, but is responding more slow than usual. Hematoma above right eye.

## 2024-03-26 NOTE — DISCHARGE INSTRUCTIONS
Follow-up with your primary care physician within 1 week for reevaluation.  If symptoms worsen or change he can return at any time for further evaluation and treatment.

## 2024-03-26 NOTE — ED PROVIDER NOTES
HPI   Chief Complaint   Patient presents with    Fall       Patient is an 89-year-old female that presents emergency department for evaluation of a fall, head injury on Eliis.  Patient had an unwitnessed fall this morning and was found on the bathroom floor with an obvious head injury with a small laceration to the right forehead and hematoma.  Patient was too weak to get up and patient's daughter called EMS to bring her in for further evaluation.  Patient does have a history of early stages dementia however is typically alert and oriented.  She seems to be much slower to respond at this time and confused more than normal.  Patient does admit to mild headache, denies any other complaints however is a very poor historian.      History provided by:  Patient and EMS personnel                      [place] Coma Scale Score: 14                     Patient History   Past Medical History:   Diagnosis Date    Anxiety     Clotting disorder (CMS/[facility])     Depression     Hyperlipidemia     Hypertension     Stroke (CMS/[facility])      Past Surgical History:   Procedure Laterality Date    CHOLECYSTECTOMY      MR HEAD ANGIO WO IV CONTRAST  9/13/2021    MR HEAD ANGIO WO IV CONTRAST [facility]     No family history on file.  Social History     Tobacco Use    Smoking status: Never    Smokeless tobacco: Never   Substance Use Topics    Alcohol use: Never    Drug use: Never       Physical Exam   ED Triage Vitals   Temp Pulse Resp BP   -- -- -- --      SpO2 Temp src Heart Rate Source Patient Position   -- -- -- --      BP Location FiO2 (%)     -- --       Physical Exam  Vitals and nursing note reviewed.   Constitutional:       General: She is not in acute distress.     Appearance: She is not ill-appearing.   HENT:      Head: Normocephalic.      Comments: Large hematoma to the right forehead     Mouth/Throat:      Mouth: Mucous membranes are moist.   Eyes:      Extraocular Movements: Extraocular movements intact.      Pupils: Pupils  are equal, round, and reactive to light.   Neck:      Comments: Cervical collar in place  Cardiovascular:      Rate and Rhythm: Normal rate and regular rhythm.   Pulmonary:      Effort: Pulmonary effort is normal. No respiratory distress.      Breath sounds: No stridor. No wheezing or rhonchi.   Abdominal:      General: Abdomen is flat.      Tenderness: There is no abdominal tenderness. There is no guarding.   Musculoskeletal:      Cervical back: Tenderness present.   Skin:     General: Skin is warm and dry.   Neurological:      Mental Status: She is alert.       Recent Results (from the past 24 hour(s))   BLOOD GAS LACTIC ACID, VENOUS    Collection Time: 03/26/24 10:07 AM   Result Value Ref Range    POCT Lactate, Venous 1.1 0.4 - 2.0 mmol/L   CBC and Auto Differential    Collection Time: 03/26/24 10:08 AM   Result Value Ref Range    WBC 5.3 4.4 - 11.3 x10*3/uL    nRBC 0.0 0.0 - 0.0 /100 WBCs    RBC 4.13 4.00 - 5.20 x10*6/uL    Hemoglobin 12.5 12.0 - 16.0 g/dL    Hematocrit 39.1 36.0 - 46.0 %    MCV 95 80 - 100 fL    MCH 30.3 26.0 - 34.0 pg    MCHC 32.0 32.0 - 36.0 g/dL    RDW 13.2 11.5 - 14.5 %    Platelets 149 (L) 150 - 450 x10*3/uL    Neutrophils % 62.6 40.0 - 80.0 %    Immature Granulocytes %, Automated 1.1 (H) 0.0 - 0.9 %    Lymphocytes % 26.6 13.0 - 44.0 %    Monocytes % 5.7 2.0 - 10.0 %    Eosinophils % 3.4 0.0 - 6.0 %    Basophils % 0.6 0.0 - 2.0 %    Neutrophils Absolute 3.29 1.60 - 5.50 x10*3/uL    Immature Granulocytes Absolute, Automated 0.06 0.00 - 0.50 x10*3/uL    Lymphocytes Absolute 1.40 0.80 - 3.00 x10*3/uL    Monocytes Absolute 0.30 0.05 - 0.80 x10*3/uL    Eosinophils Absolute 0.18 0.00 - 0.40 x10*3/uL    Basophils Absolute 0.03 0.00 - 0.10 x10*3/uL   Comprehensive Metabolic Panel    Collection Time: 03/26/24 10:08 AM   Result Value Ref Range    Glucose 100 (H) 65 - 99 mg/dL    Sodium 139 133 - 145 mmol/L    Potassium 4.8 3.4 - 5.1 mmol/L    Chloride 103 97 - 107 mmol/L    Bicarbonate 29 24 - 31  mmol/L    Urea Nitrogen 24 8 - 25 mg/dL    Creatinine 1.10 0.40 - 1.60 mg/dL    eGFR 48 (L) >60 mL/min/1.73m*2    Calcium 9.1 8.5 - 10.4 mg/dL    Albumin 3.6 3.5 - 5.0 g/dL    Alkaline Phosphatase 102 35 - 125 U/L    Total Protein 6.6 5.9 - 7.9 g/dL    AST 23 5 - 40 U/L    Bilirubin, Total 0.4 0.1 - 1.2 mg/dL    ALT 20 5 - 40 U/L    Anion Gap 7 <=19 mmol/L   Alcohol    Collection Time: 03/26/24 10:08 AM   Result Value Ref Range    Alcohol <0.010 0.000 - 0.010 g/dL   Protime-INR    Collection Time: 03/26/24 10:08 AM   Result Value Ref Range    Protime 11.6 9.3 - 12.7 seconds    INR 1.1 0.9 - 1.2   Type And Screen    Collection Time: 03/26/24 10:08 AM   Result Value Ref Range    ABO TYPE AB     Rh TYPE POS     ANTIBODY SCREEN NEG    NT Pro-BNP    Collection Time: 03/26/24 10:08 AM   Result Value Ref Range    PROBNP 387 0 - 624 pg/mL   Serial Troponin, Initial ([facility])    Collection Time: 03/26/24 10:08 AM   Result Value Ref Range    Troponin T, High Sensitivity 18 (HH) <=14 ng/L   Urinalysis with Reflex Culture and Microscopic    Collection Time: 03/26/24 11:00 AM   Result Value Ref Range    Color, Urine Light-Yellow Light-Yellow, Yellow, Dark-Yellow    Appearance, Urine Clear Clear    Specific Gravity, Urine 1.016 1.005 - 1.035    pH, Urine 7.0 5.0, 5.5, 6.0, 6.5, 7.0, 7.5, 8.0    Protein, Urine NEGATIVE NEGATIVE, 10 (TRACE), 20 (TRACE) mg/dL    Glucose, Urine Normal Normal mg/dL    Blood, Urine 0.03 (TRACE) (A) NEGATIVE    Ketones, Urine NEGATIVE NEGATIVE mg/dL    Bilirubin, Urine NEGATIVE NEGATIVE    Urobilinogen, Urine 2 (1+) (A) Normal mg/dL    Nitrite, Urine NEGATIVE NEGATIVE    Leukocyte Esterase, Urine NEGATIVE NEGATIVE   Urinalysis Microscopic    Collection Time: 03/26/24 11:00 AM   Result Value Ref Range    WBC, Urine 1-5 1-5, NONE /HPF    RBC, Urine 6-10 (A) NONE, 1-2, 3-5 /HPF    Squamous Epithelial Cells, Urine 1-9 (SPARSE) Reference range not established. /HPF    Mucus, Urine FEW Reference range not  established. /LPF   Serial Troponin, 2 Hour ([facility])    Collection Time: 03/26/24 11:55 AM   Result Value Ref Range    Troponin T, High Sensitivity 19 (HH) <=14 ng/L   VERIFY ABO/Rh Group Test    Collection Time: 03/26/24 11:55 AM   Result Value Ref Range    ABO TYPE AB     Rh TYPE POS          ED Course & [facility]   ED Course as of 03/26/24 1237   Tue Mar 26, 2024   1010 EKG Time:1006  EKG Interpretation time:1010  EKG Interpretation: EKG shows normal sinus rhythm rate of 65 bpm, normal axis, QTc 472, no evidence of STEMI.    EKG was interpreted by myself independently [JL]      ED Course User Index  [JL] Burak Saeed,          Diagnoses as of 03/26/24 1237   Closed head injury, initial encounter   Fall, initial encounter       Medical Decision Making  Patient is an 89-year-old female that presents emergency department for evaluation of a fall.  Patient obvious hematoma to the right forehead and is drowsy and slow to respond to questions.  Given the fact that she is on Eliquis with altered mental status with an obvious head injury patient was made a limited trauma at this time.  She was taken to CT scan for CT scan of the cervical spine and CT scan of the brain to rule out intracranial hemorrhage.  Chest x-ray and pelvic x-ray were also ordered given the patient's fall.  Blood work ordered including CBC, CMP, troponin, BNP, urinalysis.  EKG shows normal sinus rhythm with no evidence of STEMI.  CT scan of the brain was unremarkable showing no evidence of intracranial hemorrhage or mass.  CT scan cervical spine shows chronic degenerative changes but no evidence of fracture.  Chest x-ray does show some bilateral interstitial edema but no evidence of pneumonia, pneumothorax and does appear improved from prior imaging.  X-ray of the pelvis shows no evidence of fracture or dislocation.  Blood work unremarkable including minimally elevated troponin of 18.  There is no evidence of urinary tract infection on urinalysis.   Patient resting comfortably on reevaluation and was able to ambulate with a walker.  She is back to her baseline per her daughter who is at bedside.  I discussed these imaging results with trauma surgeon and he agrees with plan for discharge at this time as patient is back to her baseline and able to ambulate with no acute fracture or intracranial hemorrhage.  Return precautions were discussed with patient and patient's daughter and they are agreeable to plan.        Procedure  Procedures     Burak Saeed, DO  03/26/24 1232

## 2024-04-23 DIAGNOSIS — F41.9 ANXIETY: ICD-10-CM

## 2024-04-23 RX ORDER — PAROXETINE 10 MG/1
10 TABLET, FILM COATED ORAL EVERY MORNING
Qty: 90 TABLET | Refills: 1 | Status: SHIPPED | OUTPATIENT
Start: 2024-04-23 | End: 2024-10-20

## 2024-05-06 DIAGNOSIS — E78.5 HYPERLIPIDEMIA, UNSPECIFIED HYPERLIPIDEMIA TYPE: ICD-10-CM

## 2024-05-06 DIAGNOSIS — I15.9 SECONDARY HYPERTENSION: ICD-10-CM

## 2024-05-06 RX ORDER — SPIRONOLACTONE 25 MG/1
25 TABLET ORAL DAILY
Qty: 90 TABLET | Refills: 0 | Status: SHIPPED | OUTPATIENT
Start: 2024-05-06 | End: 2024-08-04

## 2024-08-27 DIAGNOSIS — E78.5 HYPERLIPIDEMIA, UNSPECIFIED HYPERLIPIDEMIA TYPE: ICD-10-CM

## 2024-08-27 RX ORDER — ATORVASTATIN CALCIUM 10 MG/1
10 TABLET, FILM COATED ORAL DAILY
Qty: 30 TABLET | Refills: 0 | Status: SHIPPED | OUTPATIENT
Start: 2024-08-27 | End: 2024-11-25

## 2024-09-17 ENCOUNTER — APPOINTMENT (OUTPATIENT)
Dept: PRIMARY CARE | Facility: CLINIC | Age: 89
End: 2024-09-17
Payer: MEDICARE

## 2024-09-17 VITALS
WEIGHT: 118 LBS | HEIGHT: 64 IN | DIASTOLIC BLOOD PRESSURE: 58 MMHG | BODY MASS INDEX: 20.14 KG/M2 | SYSTOLIC BLOOD PRESSURE: 118 MMHG

## 2024-09-17 DIAGNOSIS — F41.9 ANXIETY: ICD-10-CM

## 2024-09-17 DIAGNOSIS — F03.92 DEMENTIA WITH PSYCHOSIS (MULTI): ICD-10-CM

## 2024-09-17 DIAGNOSIS — I48.0 PAROXYSMAL ATRIAL FIBRILLATION (MULTI): ICD-10-CM

## 2024-09-17 DIAGNOSIS — G47.00 INSOMNIA, UNSPECIFIED TYPE: ICD-10-CM

## 2024-09-17 DIAGNOSIS — E78.5 HYPERLIPIDEMIA, UNSPECIFIED HYPERLIPIDEMIA TYPE: ICD-10-CM

## 2024-09-17 DIAGNOSIS — I10 HYPERTENSION, UNSPECIFIED TYPE: ICD-10-CM

## 2024-09-17 DIAGNOSIS — Z23 ENCOUNTER FOR IMMUNIZATION: Primary | ICD-10-CM

## 2024-09-17 PROCEDURE — 3074F SYST BP LT 130 MM HG: CPT | Performed by: INTERNAL MEDICINE

## 2024-09-17 PROCEDURE — 90662 IIV NO PRSV INCREASED AG IM: CPT | Performed by: INTERNAL MEDICINE

## 2024-09-17 PROCEDURE — 99215 OFFICE O/P EST HI 40 MIN: CPT | Performed by: INTERNAL MEDICINE

## 2024-09-17 PROCEDURE — 1157F ADVNC CARE PLAN IN RCRD: CPT | Performed by: INTERNAL MEDICINE

## 2024-09-17 PROCEDURE — G0008 ADMIN INFLUENZA VIRUS VAC: HCPCS | Performed by: INTERNAL MEDICINE

## 2024-09-17 PROCEDURE — 3078F DIAST BP <80 MM HG: CPT | Performed by: INTERNAL MEDICINE

## 2024-09-17 PROCEDURE — 1159F MED LIST DOCD IN RCRD: CPT | Performed by: INTERNAL MEDICINE

## 2024-09-17 RX ORDER — TRAZODONE HYDROCHLORIDE 100 MG/1
100 TABLET ORAL NIGHTLY
Qty: 90 TABLET | Refills: 1 | Status: SHIPPED | OUTPATIENT
Start: 2024-09-17 | End: 2025-03-16

## 2024-09-17 RX ORDER — PAROXETINE 10 MG/1
10 TABLET, FILM COATED ORAL EVERY MORNING
Qty: 90 TABLET | Refills: 1 | Status: SHIPPED | OUTPATIENT
Start: 2024-09-17 | End: 2025-03-16

## 2024-09-17 RX ORDER — AMLODIPINE BESYLATE 5 MG/1
5 TABLET ORAL DAILY
Qty: 90 TABLET | Refills: 1 | Status: SHIPPED | OUTPATIENT
Start: 2024-09-17

## 2024-09-17 RX ORDER — AMIODARONE HYDROCHLORIDE 200 MG/1
TABLET ORAL
Qty: 90 TABLET | Refills: 1 | Status: SHIPPED | OUTPATIENT
Start: 2024-09-17

## 2024-09-17 RX ORDER — ATORVASTATIN CALCIUM 10 MG/1
10 TABLET, FILM COATED ORAL DAILY
Qty: 90 TABLET | Refills: 1 | Status: SHIPPED | OUTPATIENT
Start: 2024-09-17 | End: 2025-03-16

## 2024-09-17 NOTE — LETTER
September 17, 2024     Patient: Heather Bowman   YOB: 1934   Date of Visit: 9/17/2024       To Whom It May Concern:    Miladis Salva has been Heather Bowman's total care giver since Aug 17, 2023. Miladis provides total care and transportation for Heather    If you have any questions or concerns, please don't hesitate to call.         Sincerely,         Rina Sanches MD        CC: No Recipients

## 2024-09-17 NOTE — LETTER
September 17, 2024     Patient: Heather Bowman   YOB: 1934   Date of Visit: 9/17/2024       To Whom It May Concern:    Miladis Pedersen is Heather Bowman's total care giver. Miladis also provides transportation to and from appointments and daily activities that Heather needs.      If you have any questions or concerns, please don't hesitate to call.         Sincerely,         Rina Sanches MD

## 2024-09-17 NOTE — LETTER
September 17, 2024     Patient: Heather Bowman   YOB: 1934   Date of Visit: 9/17/2024       To Whom It May Concern:    Miladis Mimi has been Heather Bowamn's since Aug 17, 2023. Miladis provides total care and transportation for Heather.    If you have any questions or concerns, please don't hesitate to call.         Sincerely,         Rina Sanches MD        CC: No Recipients

## 2024-09-17 NOTE — PROGRESS NOTES
Subjective   Patient ID: Heather Bowman is a 89 y.o. female who presents for Follow-up.    Med Refill       Almost nonverbal.  Since she lost her  and daughter is staying with her and taking care of her it is getting hard on her  Did not do blood work  Follow-up on hypertension high cholesterol A-fib.  Needs refills on medication  Daughter wants a letter saying patient has advanced dementia and requires 24/7 care for her ADLs    A-fib patient is here for follow-up from hospital stay for bradycardia then she was at rehab and requiring oxygen but discharged home without oxygen need  Patient is much more alert since she is back home  Her dementia remains quite dense  She is eating little better  Overall doing well      Past recap  patient is here for follow-up  Needs refills on medication  did blood work   asking for samples of eliquis     She is staying with the   She is doing better than before  Family is keeping a close eye on her  She has quite advanced dementia  She remains unsteady on her feet had few falls without any injury  Needs flu shot  Psychosis is doing better      Patient is here from ER visit  Last week patient had a fall at home. She got up not using the walker turn around and fell. No syncopal episode. Her CAT scan and x-ray was negative  Patient remains very unsteady on her feet  Patient is having cough off and on chest feels congested  Daughter is wondering if patient can have something to help with her anxiety. Been the home care nurse comes patient gets extremely anxious and does not get comfortable with the bath  Caregiver is recommending patient take Ativan     Patient is monitoring blood pressure heart rate  Heart rate is staying above 45 mostly above 50  Patient is not dizzy  Patient just states none naps a lot  She does have problem with memory but no further deterioration  Blood pressure is okay     Patient was hospitalized for severe bradycardia and fatigue and CHF  Started  "on Lasix and her dose of metoprolol was reduced  Patient is still feeling tired she is here with the daughter she states eats and sleeps  Patient is very forgetful  Daughter wants to review all her medications     Patient is here for follow-up on hospital visit  Patient had multiple stroke in setting of new onset A. fib  She was started on Eliquis but patient is not taking Eliquis does not want to take it  She had some psychosis event which improved with Seroquel  Patient does have sundowning and dementia manifested more in the hospital  At home she is sleeping better on Seroquel and feeling better        past recap  To establish PCP  Moved from [place]  July 1 fell back in shower sprained right ankle  She is having a lot of pain since then  Also patient is having a lot of anxiety she has dementia and move has made her more anxious  We will be moving in the apartment soon  Daughter's main concern also remains insomnia  Patient is using Ambien apparently recently     Past medical history: Common bile duct stent for common bile duct stones s/p removal because of cholecystitis, A. fib 1 episode post procedure, hearing loss, dementia anxiety hypertension  Social history: Non-smoker nondrinker lives with  patient is here for routine follow-up.  She is  Review of Systems    Objective   /58   Ht 1.626 m (5' 4\")   Wt 53.5 kg (118 lb)   BMI 20.25 kg/m²     Physical Exam  Vitals reviewed.   Constitutional:       Appearance: Normal appearance.   HENT:      Head: Normocephalic and atraumatic.      Right Ear: Tympanic membrane, ear canal and external ear normal.      Left Ear: Tympanic membrane, ear canal and external ear normal.      Nose: Nose normal.      Mouth/Throat:      Pharynx: Oropharynx is clear.   Eyes:      Extraocular Movements: Extraocular movements intact.      Conjunctiva/sclera: Conjunctivae normal.      Pupils: Pupils are equal, round, and reactive to light.   Cardiovascular:      Rate and " Rhythm: Normal rate. Rhythm irregular.      Pulses: Normal pulses.      Heart sounds: Normal heart sounds.   Pulmonary:      Effort: Pulmonary effort is normal.      Breath sounds: Normal breath sounds.   Abdominal:      General: Abdomen is flat. Bowel sounds are normal.      Palpations: Abdomen is soft.   Musculoskeletal:      Cervical back: Normal range of motion and neck supple.   Skin:     General: Skin is warm and dry.   Neurological:      General: No focal deficit present.      Mental Status: She is alert. She is disoriented.   Psychiatric:         Mood and Affect: Mood normal.         Assessment/Plan   Problem List Items Addressed This Visit          Cardiac and Vasculature    Atrial fibrillation (Multi)    Relevant Medications    amLODIPine (Norvasc) 5 mg tablet    amiodarone (Pacerone) 200 mg tablet       Mental Health    Anxiety    Relevant Medications    PARoxetine (Paxil) 10 mg tablet       Neuro    Dementia with psychosis (Multi)       Sleep    Insomnia    Relevant Medications    traZODone (Desyrel) 100 mg tablet     Other Visit Diagnoses       Encounter for immunization    -  Primary    Relevant Orders    Flu vaccine, trivalent, preservative free, HIGH-DOSE, age 65y+ (Fluzone) (Completed)    Hyperlipidemia, unspecified hyperlipidemia type        Relevant Medications    atorvastatin (Lipitor) 10 mg tablet    Other Relevant Orders    CBC (Completed)    Comprehensive Metabolic Panel (Completed)    Lipid Panel (Completed)    Thyroid Stimulating Hormone (Completed)    Hypertension, unspecified type        Relevant Medications    amLODIPine (Norvasc) 5 mg tablet    Other Relevant Orders    CBC (Completed)    Comprehensive Metabolic Panel (Completed)    Lipid Panel (Completed)    Thyroid Stimulating Hormone (Completed)         past recap  Patient is clinically stable  Remains forgetful  A. fib rate controlled  CHF compensated  Blood pressure stable  Medications refilled  Blood work reviewed  TSH suppressed  probably related to amiodarone  We will repeat the blood test in 3 months        1/30/23  blood work reviewed  Thyroid is in normal range now  Recheck blood work in 6 months  Heart rate controlled  Blood pressure stable  Refill amiodarone  We will look for samples for Eliquis  She has wax impaction in the right ear  Advised to use mineral oil before next visit      6/22/2023  Patient's heart rate is better  Blood pressure is stable  She is more alert  Samples of Eliquis given per daughter's request  A-fib rate controlled  CHF compensated  Follow-up in 3 months    9/14/2023  Clinically patient is stable and in fact doing better she is more alert  A-fib is controlled  CHF compensated  Blood pressure is stable  Blood work reviewed all in normal range  Samples of Eliquis given  Follow-up in 3 months    3/13/2024  Patient clinically looks better she is little bit verbal more  A-fib controlled  Blood pressure controlled  Blood work ordered for today again as patient has not done it  Medications refilled  Follow-up in 6 months    9/17/2024  Letter written for the daughter patient has advanced dementia and requires 24/7 care for her ADLs.  Daughter kiesha nascimento brings her to the appointment and provides her care  Clinically patient is stable and doing really well  Blood work ordered  Medications refilled  Blood pressure is stable  Flu shot given  Follow-up in 6 months

## 2024-09-18 ENCOUNTER — LAB (OUTPATIENT)
Dept: LAB | Facility: LAB | Age: 89
End: 2024-09-18
Payer: MEDICARE

## 2024-09-18 DIAGNOSIS — I10 HYPERTENSION, UNSPECIFIED TYPE: ICD-10-CM

## 2024-09-18 DIAGNOSIS — E78.5 HYPERLIPIDEMIA, UNSPECIFIED HYPERLIPIDEMIA TYPE: ICD-10-CM

## 2024-09-18 LAB
ALBUMIN SERPL BCP-MCNC: 4.2 G/DL (ref 3.4–5)
ALP SERPL-CCNC: 145 U/L (ref 33–136)
ALT SERPL W P-5'-P-CCNC: 14 U/L (ref 7–45)
ANION GAP SERPL CALC-SCNC: 14 MMOL/L (ref 10–20)
AST SERPL W P-5'-P-CCNC: 19 U/L (ref 9–39)
BILIRUB SERPL-MCNC: 1.2 MG/DL (ref 0–1.2)
BUN SERPL-MCNC: 28 MG/DL (ref 6–23)
CALCIUM SERPL-MCNC: 9.8 MG/DL (ref 8.6–10.6)
CHLORIDE SERPL-SCNC: 100 MMOL/L (ref 98–107)
CHOLEST SERPL-MCNC: 140 MG/DL (ref 0–199)
CHOLESTEROL/HDL RATIO: 4.1
CO2 SERPL-SCNC: 30 MMOL/L (ref 21–32)
CREAT SERPL-MCNC: 1.07 MG/DL (ref 0.5–1.05)
EGFRCR SERPLBLD CKD-EPI 2021: 49 ML/MIN/1.73M*2
ERYTHROCYTE [DISTWIDTH] IN BLOOD BY AUTOMATED COUNT: 12.7 % (ref 11.5–14.5)
GLUCOSE SERPL-MCNC: 100 MG/DL (ref 74–99)
HCT VFR BLD AUTO: 44.1 % (ref 36–46)
HDLC SERPL-MCNC: 34.1 MG/DL
HGB BLD-MCNC: 14.4 G/DL (ref 12–16)
LDLC SERPL CALC-MCNC: 80 MG/DL
MCH RBC QN AUTO: 30.4 PG (ref 26–34)
MCHC RBC AUTO-ENTMCNC: 32.7 G/DL (ref 32–36)
MCV RBC AUTO: 93 FL (ref 80–100)
NON HDL CHOLESTEROL: 106 MG/DL (ref 0–149)
NRBC BLD-RTO: 0 /100 WBCS (ref 0–0)
PLATELET # BLD AUTO: 231 X10*3/UL (ref 150–450)
POTASSIUM SERPL-SCNC: 4.4 MMOL/L (ref 3.5–5.3)
PROT SERPL-MCNC: 7.6 G/DL (ref 6.4–8.2)
RBC # BLD AUTO: 4.74 X10*6/UL (ref 4–5.2)
SODIUM SERPL-SCNC: 140 MMOL/L (ref 136–145)
TRIGL SERPL-MCNC: 129 MG/DL (ref 0–149)
TSH SERPL-ACNC: 0.95 MIU/L (ref 0.44–3.98)
VLDL: 26 MG/DL (ref 0–40)
WBC # BLD AUTO: 7.6 X10*3/UL (ref 4.4–11.3)

## 2024-09-18 PROCEDURE — 85027 COMPLETE CBC AUTOMATED: CPT

## 2024-09-18 PROCEDURE — 84443 ASSAY THYROID STIM HORMONE: CPT

## 2024-09-18 PROCEDURE — 36415 COLL VENOUS BLD VENIPUNCTURE: CPT

## 2024-09-18 PROCEDURE — 80061 LIPID PANEL: CPT

## 2024-09-18 PROCEDURE — 80053 COMPREHEN METABOLIC PANEL: CPT

## 2024-11-19 DIAGNOSIS — I15.9 SECONDARY HYPERTENSION: ICD-10-CM

## 2024-11-19 RX ORDER — SPIRONOLACTONE 25 MG/1
25 TABLET ORAL DAILY
Qty: 90 TABLET | Refills: 0 | Status: SHIPPED | OUTPATIENT
Start: 2024-11-19 | End: 2025-02-17

## 2024-12-20 DIAGNOSIS — F41.9 ANXIETY: ICD-10-CM

## 2024-12-20 RX ORDER — QUETIAPINE FUMARATE 25 MG/1
25 TABLET, FILM COATED ORAL 2 TIMES DAILY
Qty: 180 TABLET | Refills: 0 | Status: SHIPPED | OUTPATIENT
Start: 2024-12-20 | End: 2025-03-20

## 2025-01-28 DIAGNOSIS — I48.91 ATRIAL FIBRILLATION, UNSPECIFIED TYPE (MULTI): ICD-10-CM

## 2025-01-28 RX ORDER — APIXABAN 2.5 MG/1
2.5 TABLET, FILM COATED ORAL 2 TIMES DAILY
Qty: 180 TABLET | Refills: 1 | Status: SHIPPED | OUTPATIENT
Start: 2025-01-28

## 2025-01-30 ENCOUNTER — TELEPHONE (OUTPATIENT)
Dept: PRIMARY CARE | Facility: CLINIC | Age: OVER 89
End: 2025-01-30
Payer: MEDICARE

## 2025-02-13 DIAGNOSIS — I15.9 SECONDARY HYPERTENSION: ICD-10-CM

## 2025-02-13 RX ORDER — SPIRONOLACTONE 25 MG/1
25 TABLET ORAL DAILY
Qty: 30 TABLET | Refills: 0 | Status: SHIPPED | OUTPATIENT
Start: 2025-02-13

## 2025-03-13 ENCOUNTER — APPOINTMENT (OUTPATIENT)
Dept: PRIMARY CARE | Facility: CLINIC | Age: OVER 89
End: 2025-03-13
Payer: MEDICARE

## 2025-03-24 DIAGNOSIS — I15.9 SECONDARY HYPERTENSION: ICD-10-CM

## 2025-03-24 RX ORDER — SPIRONOLACTONE 25 MG/1
25 TABLET ORAL DAILY
Qty: 30 TABLET | Refills: 0 | Status: SHIPPED | OUTPATIENT
Start: 2025-03-24

## 2025-03-27 ENCOUNTER — APPOINTMENT (OUTPATIENT)
Dept: PRIMARY CARE | Facility: CLINIC | Age: OVER 89
End: 2025-03-27
Payer: MEDICARE

## 2025-03-27 VITALS — SYSTOLIC BLOOD PRESSURE: 118 MMHG | HEIGHT: 64 IN | BODY MASS INDEX: 20.25 KG/M2 | DIASTOLIC BLOOD PRESSURE: 70 MMHG

## 2025-03-27 DIAGNOSIS — F41.9 ANXIETY: ICD-10-CM

## 2025-03-27 DIAGNOSIS — E78.5 HYPERLIPIDEMIA, UNSPECIFIED HYPERLIPIDEMIA TYPE: ICD-10-CM

## 2025-03-27 DIAGNOSIS — F03.92 DEMENTIA WITH PSYCHOSIS (MULTI): ICD-10-CM

## 2025-03-27 PROCEDURE — 3074F SYST BP LT 130 MM HG: CPT | Performed by: INTERNAL MEDICINE

## 2025-03-27 PROCEDURE — 3078F DIAST BP <80 MM HG: CPT | Performed by: INTERNAL MEDICINE

## 2025-03-27 PROCEDURE — 1157F ADVNC CARE PLAN IN RCRD: CPT | Performed by: INTERNAL MEDICINE

## 2025-03-27 PROCEDURE — 99214 OFFICE O/P EST MOD 30 MIN: CPT | Performed by: INTERNAL MEDICINE

## 2025-03-27 PROCEDURE — 1159F MED LIST DOCD IN RCRD: CPT | Performed by: INTERNAL MEDICINE

## 2025-03-27 RX ORDER — PAROXETINE 10 MG/1
10 TABLET, FILM COATED ORAL EVERY MORNING
Qty: 90 TABLET | Refills: 1 | Status: SHIPPED | OUTPATIENT
Start: 2025-03-27 | End: 2025-09-23

## 2025-03-27 RX ORDER — ATORVASTATIN CALCIUM 10 MG/1
10 TABLET, FILM COATED ORAL DAILY
Qty: 90 TABLET | Refills: 1 | Status: SHIPPED | OUTPATIENT
Start: 2025-03-27 | End: 2025-09-23

## 2025-03-30 NOTE — PROGRESS NOTES
Subjective   Patient ID: Heather Bowman is a 89 y.o. female who presents for Follow-up.    Med Refill       Patient is here for follow-up  Needs refills on medication  Daughter is saying patient is declining a lot.  Patient is almost nonverbal spoke few sentences.  She needs help and she is willing to consider hospice     Past recap   almost nonverbal.  Since she lost her  and daughter is staying with her and taking care of her it is getting hard on her  Did not do blood work  Follow-up on hypertension high cholesterol A-fib.  Needs refills on medication  Daughter wants a letter saying patient has advanced dementia and requires 24/7 care for her ADLs    A-fib patient is here for follow-up from hospital stay for bradycardia then she was at rehab and requiring oxygen but discharged home without oxygen need  Patient is much more alert since she is back home  Her dementia remains quite dense  She is eating little better  Overall doing well      Past recap  patient is here for follow-up  Needs refills on medication  did blood work   asking for samples of eliquis     She is staying with the   She is doing better than before  Family is keeping a close eye on her  She has quite advanced dementia  She remains unsteady on her feet had few falls without any injury  Needs flu shot  Psychosis is doing better      Patient is here from ER visit  Last week patient had a fall at home. She got up not using the walker turn around and fell. No syncopal episode. Her CAT scan and x-ray was negative  Patient remains very unsteady on her feet  Patient is having cough off and on chest feels congested  Daughter is wondering if patient can have something to help with her anxiety. Been the home care nurse comes patient gets extremely anxious and does not get comfortable with the bath  Caregiver is recommending patient take Ativan     Patient is monitoring blood pressure heart rate  Heart rate is staying above 45 mostly above  "50  Patient is not dizzy  Patient just states none naps a lot  She does have problem with memory but no further deterioration  Blood pressure is okay     Patient was hospitalized for severe bradycardia and fatigue and CHF  Started on Lasix and her dose of metoprolol was reduced  Patient is still feeling tired she is here with the daughter she states eats and sleeps  Patient is very forgetful  Daughter wants to review all her medications     Patient is here for follow-up on hospital visit  Patient had multiple stroke in setting of new onset A. fib  She was started on Eliquis but patient is not taking Eliquis does not want to take it  She had some psychosis event which improved with Seroquel  Patient does have sundowning and dementia manifested more in the hospital  At home she is sleeping better on Seroquel and feeling better        past recap  To establish PCP  Moved from [place]  July 1 fell back in shower sprained right ankle  She is having a lot of pain since then  Also patient is having a lot of anxiety she has dementia and move has made her more anxious  We will be moving in the apartment soon  Daughter's main concern also remains insomnia  Patient is using Ambien apparently recently     Past medical history: Common bile duct stent for common bile duct stones s/p removal because of cholecystitis, A. fib 1 episode post procedure, hearing loss, dementia anxiety hypertension  Social history: Non-smoker nondrinker lives with  patient is here for routine follow-up.  She is  Review of Systems    Objective   /70   Ht 1.626 m (5' 4\")   BMI 20.25 kg/m²     Physical Exam  Vitals reviewed.   Constitutional:       Appearance: Normal appearance. She is ill-appearing.   HENT:      Head: Normocephalic and atraumatic.      Right Ear: Tympanic membrane, ear canal and external ear normal.      Left Ear: Tympanic membrane, ear canal and external ear normal.      Nose: Nose normal.      Mouth/Throat:      Pharynx: " Oropharynx is clear.   Eyes:      Extraocular Movements: Extraocular movements intact.      Conjunctiva/sclera: Conjunctivae normal.      Pupils: Pupils are equal, round, and reactive to light.   Cardiovascular:      Rate and Rhythm: Normal rate. Rhythm irregular.      Pulses: Normal pulses.      Heart sounds: Normal heart sounds.   Pulmonary:      Effort: Pulmonary effort is normal.      Breath sounds: Normal breath sounds.   Abdominal:      General: Abdomen is flat. Bowel sounds are normal.      Palpations: Abdomen is soft.   Musculoskeletal:      Cervical back: Normal range of motion and neck supple.   Skin:     General: Skin is warm and dry.   Neurological:      General: No focal deficit present.      Mental Status: She is alert. She is disoriented.   Psychiatric:         Mood and Affect: Mood normal.         Assessment/Plan   Problem List Items Addressed This Visit          Mental Health    Anxiety    Relevant Medications    PARoxetine (Paxil) 10 mg tablet       Neuro    Dementia with psychosis (Multi)    Relevant Orders    Referral to Hospice     Other Visit Diagnoses       Hyperlipidemia, unspecified hyperlipidemia type        Relevant Medications    atorvastatin (Lipitor) 10 mg tablet         past recap  Patient is clinically stable  Remains forgetful  A. fib rate controlled  CHF compensated  Blood pressure stable  Medications refilled  Blood work reviewed  TSH suppressed probably related to amiodarone  We will repeat the blood test in 3 months        1/30/23  blood work reviewed  Thyroid is in normal range now  Recheck blood work in 6 months  Heart rate controlled  Blood pressure stable  Refill amiodarone  We will look for samples for Eliquis  She has wax impaction in the right ear  Advised to use mineral oil before next visit      6/22/2023  Patient's heart rate is better  Blood pressure is stable  She is more alert  Samples of Eliquis given per daughter's request  A-fib rate controlled  CHF  compensated  Follow-up in 3 months    9/14/2023  Clinically patient is stable and in fact doing better she is more alert  A-fib is controlled  CHF compensated  Blood pressure is stable  Blood work reviewed all in normal range  Samples of Eliquis given  Follow-up in 3 months    3/13/2024  Patient clinically looks better she is little bit verbal more  A-fib controlled  Blood pressure controlled  Blood work ordered for today again as patient has not done it  Medications refilled  Follow-up in 6 months    9/17/2024  Letter written for the daughter patient has advanced dementia and requires 24/7 care for her ADLs.  Daughter kiesha nascimento brings her to the appointment and provides her care  Clinically patient is stable and doing really well  Blood work ordered  Medications refilled  Blood pressure is stable  Flu shot given  Follow-up in 6 months    3/27/2025  Medications refilled  Patient daughter does not want to do blood work at this time  Will consult hospice care